# Patient Record
Sex: FEMALE | URBAN - METROPOLITAN AREA
[De-identification: names, ages, dates, MRNs, and addresses within clinical notes are randomized per-mention and may not be internally consistent; named-entity substitution may affect disease eponyms.]

---

## 2021-08-13 ENCOUNTER — OFFICE VISIT (OUTPATIENT)
Dept: URBAN - METROPOLITAN AREA CLINIC 6 | Facility: CLINIC | Age: 71
End: 2021-08-13
Payer: MEDICARE

## 2021-08-13 DIAGNOSIS — S05.12XA CONTUSION OF EYEBALL AND ORBITAL TISSUES, LEFT EYE, INITIAL ENCOUNTER: Primary | ICD-10-CM

## 2021-08-13 PROCEDURE — 92012 INTRM OPH EXAM EST PATIENT: CPT | Performed by: OPTOMETRIST

## 2021-08-13 ASSESSMENT — INTRAOCULAR PRESSURE: OD: 12

## 2021-08-13 NOTE — IMPRESSION/PLAN
Impression: Contusion of eyeball and orbital tissues, left eye, initial encounter: S05.12XA. Plan: Pt had eyelid procedure (tarsorrhaphy) with Dr. Joanne Denver which was temporary but waiting for a more permanent solution with another procedure next Wednesday, but the patient fell. Undilated view of fundus appears normal today. Use Ice packs for swelling, continue with appt with Dr. Joanne Denver on Wednesday and here in October.